# Patient Record
Sex: FEMALE | Race: WHITE | NOT HISPANIC OR LATINO | Employment: OTHER | URBAN - NONMETROPOLITAN AREA
[De-identification: names, ages, dates, MRNs, and addresses within clinical notes are randomized per-mention and may not be internally consistent; named-entity substitution may affect disease eponyms.]

---

## 2019-01-31 ENCOUNTER — OFFICE VISIT (OUTPATIENT)
Dept: URGENT CARE | Facility: PHYSICIAN GROUP | Age: 69
End: 2019-01-31
Payer: MEDICARE

## 2019-01-31 VITALS
RESPIRATION RATE: 14 BRPM | TEMPERATURE: 98.3 F | SYSTOLIC BLOOD PRESSURE: 126 MMHG | HEART RATE: 104 BPM | BODY MASS INDEX: 23.56 KG/M2 | HEIGHT: 64 IN | DIASTOLIC BLOOD PRESSURE: 82 MMHG | OXYGEN SATURATION: 98 % | WEIGHT: 138 LBS

## 2019-01-31 DIAGNOSIS — J06.9 UPPER RESPIRATORY TRACT INFECTION, UNSPECIFIED TYPE: ICD-10-CM

## 2019-01-31 PROCEDURE — 99203 OFFICE O/P NEW LOW 30 MIN: CPT | Performed by: EMERGENCY MEDICINE

## 2019-01-31 RX ORDER — DOXYCYCLINE HYCLATE 100 MG
100 TABLET ORAL 2 TIMES DAILY
Qty: 20 TAB | Refills: 0 | Status: SHIPPED | OUTPATIENT
Start: 2019-01-31

## 2019-01-31 ASSESSMENT — ENCOUNTER SYMPTOMS
NAUSEA: 0
SENSORY CHANGE: 0
NERVOUS/ANXIOUS: 0
CHILLS: 0
COUGH: 1
SPUTUM PRODUCTION: 0
FEVER: 0
PALPITATIONS: 0
VOMITING: 0
HEMOPTYSIS: 0

## 2019-01-31 NOTE — PROGRESS NOTES
Subjective:      Debora Ross is a 68 y.o. female who presents with URI            HPI   Pt 68 with a cough for the past 3 weeks, pt  Is a non smoker, but 45 pyh of tobacco. Has been taking OTC meds with continued coughing.  Patient denies any fever chills nausea vomiting or diarrhea.  PMH:  has a past medical history of Allergy; Anemia (10/7/2011); Anxiety disorder (10/7/2011); Arthritis (10/7/2011); ASTHMA; CATARACT; Costochondral chest pain (1/14/2014); Depression (10/7/2011); Edema (10/7/2011); H/O gastric bypass (1/14/2014); Headache(784.0); Hypertension; Insomnia (10/7/2011); Migraine; Muscle disorder; and OSTEOPOROSIS. She also has no past medical history of Addisons disease (HCC); Adrenal disorder (HCC); Arrhythmia; Blood transfusion; Cancer (HCC); CHF (congestive heart failure) (HCC); Clotting disorder (HCC); COPD; Cushings syndrome (HCC); Diabetes; Diabetic neuropathy (HCC); EMPHYSEMA; GERD (gastroesophageal reflux disease); Glaucoma; Goiter; Heart attack (HCC); Heart murmur; HIV (human immunodeficiency virus infection); Hyperlipidemia; IBD (inflammatory bowel disease); Kidney disease; Meningitis; Parathyroid disorder (HCC); Pituitary disease (HCC); Seizure (HCC); Sickle cell disease (HCC); Substance abuse (HCC); Thyroid disease; Tuberculosis; Ulcer; or Urinary tract infection, site not specified.  MEDS:   Current Outpatient Prescriptions:   •  doxycycline (VIBRAMYCIN) 100 MG Tab, Take 1 Tab by mouth 2 times a day., Disp: 20 Tab, Rfl: 0  •  diazepam (VALIUM) 10 MG tablet, Take 1 Tab by mouth every 8 hours as needed., Disp: 90 Each, Rfl: 5  •  citalopram (CELEXA) 40 MG TABS, Take 1 Tab by mouth every day., Disp: 90 Tab, Rfl: 3  •  hydrocodone-acetaminophen (NORCO) 7.5-325 MG per tablet, Take 1 Tab by mouth every 6 hours as needed., Disp: 120 Each, Rfl: 2  •  diclofenac epolamine (FLECTOR) 1.3 % PTCH, Apply 1 Patch to skin as directed 2 Times a Day., Disp: 60 Each, Rfl: 3  •  cyanocobalamin (VITAMIN  B-12) 1000 MCG/ML SOLN, 1 mL by Intramuscular route every 30 days., Disp: 1 mL, Rfl: 11  •  vitamin D, Ergocalciferol, (DRISDOL) 31374 UNITS CAPS capsule, Take 1 Cap by mouth every 7 days., Disp: 30 Cap, Rfl: 3  •  sertraline (ZOLOFT) 50 MG TABS, Take 1 Tab by mouth 2 Times a Day., Disp: 60 Tab, Rfl: 5  •  olopatadine (PATANOL) 0.1 % ophthalmic solution, Place 1 Drop in both eyes 2 times a day., Disp: 5 mL, Rfl: 3  •  celecoxib (CELEBREX) 200 MG CAPS, Take 1 Cap by mouth every day., Disp: 90 Cap, Rfl: 3  •  furosemide (LASIX) 20 MG TABS, Take 1 Tab by mouth every day., Disp: 90 Each, Rfl: 3  •  promethazine (PHENERGAN) 50 MG TABS, Take 1 Tab by mouth every 6 hours as needed for Nausea/Vomiting., Disp: 30 Each, Rfl: 3  •  triamcinolone acetonide (KENALOG) 0.1 % CREA, Apply to area twice daily for seven to ten days, Disp: 1 Tube, Rfl: 0  •  Tobramycin-Dexamethasone 0.3-0.05 % SUSP, 1 Each by Ophthalmic route every 12 hours., Disp: 1 Bottle, Rfl: 0  •  zolpidem (AMBIEN) 10 MG TABS, Take 1 Tab by mouth at bedtime as needed., Disp: 30 Each, Rfl: 5  •  Ferrous Sulfate (IRON) 325 (65 FE) MG TABS, Take  by mouth every bedtime., Disp: , Rfl:   •  Promethazine HCl (PHENERGAN PO), Take 50 mg by mouth., Disp: , Rfl:   •  Cyanocobalamin 1000 MCG TBCR, Take  by mouth., Disp: , Rfl:   ALLERGIES:   Allergies   Allergen Reactions   • Bacitracin    • Epinephrine    • Lidocaine    • Morphine    • Novocain    • Pcn [Penicillins]      SURGHX:   Past Surgical History:   Procedure Laterality Date   • ABDOMINAL HYSTERECTOMY TOTAL     • APPENDECTOMY     • BLADDER SLING FEMALE     • BUNIONECTOMY     • CATARACT EXTRACTION WITH IOL     • EYE SURGERY     • GASTRIC BYPASS LAPAROSCOPIC     • KNEE ARTHROTOMY     • OOPHORECTOMY     • ORIF, FEMUR     • RADIUS ULNA ORIF     • RELEASE PLANTAR FASCIA       SOCHX:  reports that she quit smoking about 7 years ago. Her smoking use included Cigarettes. She smoked 1.50 packs per day. She uses smokeless  "tobacco. She reports that she does not drink alcohol or use drugs.  FH: Reviewed with patient, not pertinent to this visit.   Review of Systems   Constitutional: Negative for chills and fever.   Respiratory: Positive for cough. Negative for hemoptysis and sputum production.    Cardiovascular: Negative for chest pain and palpitations.   Gastrointestinal: Negative for nausea and vomiting.   Genitourinary: Negative.    Skin: Negative for rash.   Neurological: Negative for sensory change.   Psychiatric/Behavioral: The patient is not nervous/anxious.           Objective:     /82 (BP Location: Right arm, Patient Position: Sitting, BP Cuff Size: Adult)   Pulse (!) 104   Temp 36.8 °C (98.3 °F) (Temporal)   Resp 14   Ht 1.626 m (5' 4\")   Wt 62.6 kg (138 lb)   SpO2 98%   BMI 23.69 kg/m²      Physical Exam   Constitutional: She is oriented to person, place, and time. She appears well-developed. No distress.   HENT:   Head: Normocephalic and atraumatic.   Right Ear: External ear normal.   Left Ear: External ear normal.   Eyes: Right eye exhibits no discharge. Left eye exhibits no discharge.   Neck: Normal range of motion.   Cardiovascular: Normal rate and regular rhythm.    Pulmonary/Chest: Effort normal and breath sounds normal.   Musculoskeletal: Normal range of motion. She exhibits no edema, tenderness or deformity.   Neurological: She is alert and oriented to person, place, and time. No cranial nerve deficit.   Skin: Skin is dry. No erythema.   Psychiatric: She has a normal mood and affect. Her behavior is normal.   Nursing note and vitals reviewed.              Assessment/Plan:     DX URI        Pt allergic to meds that cross with Tessalon and Codeine.  I am recommending the patient initiate/ continue hydration efforts including the use of a vaporizer/humidifier/ netti pot. I also recommend the pt, initiate Mucinex DM.  In addition the patient will initiate the prescribed prescription medication/s: Vibramycin. "  on a contingency basis.  If the patient's condition exacerbates with worsening dysphagia, shortness of breath, uncontrolled fever, headache or chest pressure he/she will return immediately to the urgent care or go to  the emergency department for further evaluation.    Gordon Arreaga